# Patient Record
(demographics unavailable — no encounter records)

---

## 2025-05-05 NOTE — REASON FOR VISIT
[Initial Evaluation] : an initial evaluation [Family Member] : family member [FreeTextEntry1] : Cognitive decline - memory loss.

## 2025-05-05 NOTE — DATA REVIEWED
[No studies available for review at this time.] : No studies available for review at this time. [de-identified] : Multilevel degenerative disc disease resulting in up to moderate spinal canal stenosis at C3-4 and C4-5 and at least moderate stenosis of several bilateral neural foramina as detailed above.  No cord signal abnormality.  ICD 10 - Cervical disc degeneration, M50.30 Spinal stenosis, M48.02 Cervical foraminal stenosis M99.61 Facet arthritis of cervical region, M46.92  Signed by: Yuki Dunlap MD Signed Date: 11/28/2020 4:03 PM EST   SIGNED BY: Yuki Dunlap M.D., Ext. [id number] 11/28/2020 04:03 PM [de-identified] : 1. Mild BILATERAL (less than 50%) internal carotid artery stenosis (hemodynamically significant defined as equal to or greater than 50%). 2. Bilaterally NORMAL antegrade vertebral artery flow with low-resistance waveform morphology. There is no sonographic explanation for this patient's vertigo and further evaluation with MRA with attention to the posterior circulation is suggested if symptoms persist and/or a vascular etiology is otherwise strongly suspected clinically. 3. Remaining visible circulation otherwise demonstrates similar appearance without additional focal findings.     Signed by: Toni Nunez MD Signed Date: 7/6/2021 1:35 PM EDT    SIGNED BY: Toni Nunez M.D., Ext. [id number] 07/06/2021 01:35 PM

## 2025-05-05 NOTE — PHYSICAL EXAM
[General Appearance - Alert] : alert [General Appearance - In No Acute Distress] : in no acute distress [Oriented To Time, Place, And Person] : oriented to person, place, and time [Impaired Insight] : insight and judgment were intact [Affect] : the affect was normal [Person] : oriented to person [Place] : oriented to place [Time] : oriented to time [Short Term Intact] : short term memory intact [Remote Intact] : remote memory intact [Registration Intact] : recent registration memory intact [Concentration Intact] : normal concentrating ability [Visual Intact] : visual attention was ~T not ~L decreased [Naming Objects] : no difficulty naming common objects [Repeating Phrases] : no difficulty repeating a phrase [Writing A Sentence] : no difficulty writing a sentence [Fluency] : fluency intact [Comprehension] : comprehension intact [Reading] : reading intact [Current Events] : adequate knowledge of current events [Past History] : adequate knowledge of personal past history [Vocabulary] : adequate range of vocabulary [Total Score ___ / 30] : the patient achieved a score of [unfilled] /30 [Date / Time ___ / 5] : date / time [unfilled] / 5 [Place ___ / 5] : place [unfilled] / 5 [Registration ___ / 3] : registration [unfilled] / 3 [Serial Sevens ___/5] : serial sevens [unfilled] / 5 [Naming 2 Objects ___ / 2] : naming two objects [unfilled] / 2 [Repeating a Sentence ___ / 1] : repeating a sentence [unfilled] / 1 [Writing a Sentence ___ / 1] : write sentence [unfilled] / 1 [3-stage Verbal Command ___ / 3] : three-stage verbal command [unfilled] / 3 [Written Command ___ / 1] : written command [unfilled] / 1 [Copy a Design ___ / 1] : copy a design [unfilled] / 1 [Recall ___ / 3] : recall [unfilled] / 3 [Cranial Nerves Optic (II)] : visual acuity intact bilaterally,  visual fields full to confrontation, pupils equal round and reactive to light [Cranial Nerves Oculomotor (III)] : extraocular motion intact [Cranial Nerves Trigeminal (V)] : facial sensation intact symmetrically [Cranial Nerves Facial (VII)] : face symmetrical [Cranial Nerves Vestibulocochlear (VIII)] : hearing was intact bilaterally [Cranial Nerves Glossopharyngeal (IX)] : tongue and palate midline [Cranial Nerves Accessory (XI - Cranial And Spinal)] : head turning and shoulder shrug symmetric [Cranial Nerves Hypoglossal (XII)] : there was no tongue deviation with protrusion [Motor Tone] : muscle tone was normal in all four extremities [Motor Strength] : muscle strength was normal in all four extremities [Involuntary Movements] : no involuntary movements were seen [No Muscle Atrophy] : normal bulk in all four extremities [Motor Handedness Right-Handed] : the patient is right hand dominant [Sensation Tactile Decrease] : light touch was intact [Balance] : balance was intact [1+] : Ankle jerk left 1+ [Sclera] : the sclera and conjunctiva were normal [PERRL With Normal Accommodation] : pupils were equal in size, round, reactive to light, with normal accommodation [Extraocular Movements] : extraocular movements were intact [No APD] : no afferent pupillary defect [No JUAN M] : no internuclear ophthalmoplegia [Full Visual Field] : full visual field [Outer Ear] : the ears and nose were normal in appearance [Neck Appearance] : the appearance of the neck was normal [Edema] : there was no peripheral edema [Abnormal Walk] : normal gait [] : no rash [Motor Strength Upper Extremities Bilaterally] : strength was normal in both upper extremities [Motor Strength Lower Extremities Bilaterally] : strength was normal in both lower extremities [Romberg's Sign] : Romberg's sign was negtive [Limited Balance] : balance was intact [Past-pointing] : there was no past-pointing [Tremor] : no tremor present [Plantar Reflex Right Only] : normal on the right [Plantar Reflex Left Only] : normal on the left [FreeTextEntry4] : Mental Status Exam Presidents: 5/5 Alternating Pattern: ok Spiral: ok Clock: 3/3 Repetition: ok  R/L discrimination on self and examiner: ok Cross-line commands: ok Praxis: -Motor: ok -Dynamic/Luria: ok -Ideomotor/Imitation: ok  -Ideational/writing/closing-in: ok. -Dressing: [FreeTextEntry8] : Mildly slow gait, no parkinsonism

## 2025-05-05 NOTE — ASSESSMENT
[FreeTextEntry1] : Assessment: 83yo RH AAW with CV risk factors, here for STM loss over the last 4-5y and after COVID. Mild executive issues and slow processing (counting) while the rest seems intact. Pt is very sharp, very high cognitive reserve. 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  No imaging available @ [facility] and [facility] and PACS (except 2020 MRI c-spine with DJD) and USCD <50% stenoses).  Diagnostic Impression: -forgetfulness  Plan: Rule out reversible and vascular causes: -B vitamins, TFT, RPR -MRI brain w/o teresa -basic inflammatory labs -Lyme serology -HST - admits fatigue and sleepiness in daytime -consider USCD and TCD after MRI -no changes in Rx for now but can consider ACHEi after testing.

## 2025-05-05 NOTE — HISTORY OF PRESENT ILLNESS
[FreeTextEntry1] : COVID 3x, 2020 and later - hospitalized 2x, treated once with Paxlovid. COVID VACCINE FULL.  PMH: HTN, HLD, DM, COPD, here for concerns of memory loss. 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   HPI: Pt referred here by PCP for memory loss. For some time now, maybe a few years and since COVID, she c/o STM loss - recent events,    -Memory: recent events, tasks, sporadically names of acquaintances -Language: ok, no major issues, seldom WFD and at times forgets her thoughts -Visuo-spatial/Orientation: mostly ok, one episode where she felt disoriented in a space she used to know -Praxis: some issues with more advanced tech, rest ok -Executive fx/Decision making/Attention/Multitasking: ok   -Sleep: ok, tends to sleep a lot; was tested for JOSEPH - reports test was negative   -Appetite: ok   -Motor symptoms: tends to get SOB, uses walker for fatigue; some paresthesia in hands and feet, more at night   -B/B: urgency   -Psychiatric symptoms: 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 Writing checks, paying bills, balancing checkbook 1 2. Assembling tax records, business affairs, or papers 2 3. Shopping alone for clothes, household necessities, or groceries 0 4. Playing a game of skill, working on a hobby 0 5. Heating water, making a cup of coffee, turning off stove after use 0 6. Preparing a balanced meal 0 7. Keeping track of current events 0 8. Paying attention to, understanding, discussing TV, book, magazine 0 9. Remembering appointments, family occasions, holidays, medications 0 10. Traveling out of neighborhood, driving, arranging to take buses 0 TOTAL SCORE: 3   Nelson Index of [place] in Activities of Daily Livin. Bathing/Showerin 2. Dressin 3. Toiletin 4. Transferrin 5. Continence:1 6: Feedin   TOTAL:6   Nelli-Mark Instrumental Activities of Daily Living: A. Ability to Use Telephone:1 B. Shoppin C. Food Preparation:1 D. Housekeepin E. Laundry:1 F. Transportation:0-stopped driving recently - preferred to not drive anymore G. Responsibility for Own Medications:1 H: Ability to Handle Finances:0   TOTAL: 6   CDR: 0.5   -Professional status: RN, retired   PCP and other physicians: -PCP: Sumit   Workup done: NA.